# Patient Record
Sex: MALE | Race: BLACK OR AFRICAN AMERICAN | NOT HISPANIC OR LATINO | Employment: PART TIME | URBAN - METROPOLITAN AREA
[De-identification: names, ages, dates, MRNs, and addresses within clinical notes are randomized per-mention and may not be internally consistent; named-entity substitution may affect disease eponyms.]

---

## 2023-03-30 DIAGNOSIS — Z71.3 NUTRITIONAL COUNSELING: Primary | ICD-10-CM

## 2023-04-05 ENCOUNTER — CLINICAL SUPPORT (OUTPATIENT)
Dept: NUTRITION | Facility: CLINIC | Age: 23
End: 2023-04-05
Payer: OTHER GOVERNMENT

## 2023-04-05 DIAGNOSIS — Z71.3 NUTRITIONAL COUNSELING: ICD-10-CM

## 2023-04-05 PROCEDURE — 97802 MEDICAL NUTRITION INDIV IN: CPT | Mod: 95,,, | Performed by: DIETITIAN, REGISTERED

## 2023-04-05 PROCEDURE — 97802 PR MED NUTR THER, 1ST, INDIV, EA 15 MIN: ICD-10-PCS | Mod: 95,,, | Performed by: DIETITIAN, REGISTERED

## 2023-04-05 NOTE — PROGRESS NOTES
The patient location is: Pt home  The chief complaint leading to consultation is: lowering his A1c, and cholesterol    Visit type: audiovisual    Face to Face time with patient: 90 minutes  120 minutes of total time spent on the encounter, which includes face to face time and non-face to face time preparing to see the patient (eg, review of tests), Obtaining and/or reviewing separately obtained history, Documenting clinical information in the electronic or other health record, Independently interpreting results (not separately reported) and communicating results to the patient/family/caregiver, or Care coordination (not separately reported).         Each patient to whom he or she provides medical services by telemedicine is:  (1) informed of the relationship between the physician and patient and the respective role of any other health care provider with respect to management of the patient; and (2) notified that he or she may decline to receive medical services by telemedicine and may withdraw from such care at any time.    Notes:   Nutrition Assessment    Visit Type: Insurance initial  Session Time:  2 Hours  Reason for MNT visit: Pt in for education and nutrition counseling regarding Prediabetes and high cholesterol .       Age: 22 y.o.  Wt:   Wt Readings from Last 1 Encounters:   04/13/23 77.1 kg (170 lb)     Ht:   Ht Readings from Last 1 Encounters:   No data found for Ht     BMI:   BMI Readings from Last 1 Encounters:   No data found for BMI       Client states:  [place] in school [place]. Where he is living, he does not have the option to cook for himself and often purchases meals outside of the home. He does not get his care at [facility], pt reported that he is prediabetic and has high cholesterol. After going to the doctor and hearing the diagnosis, pt has been making positive changes to his diet on his own. Pt reported that he used to eat little megan cakes throughout the day  and have multiple energy drinks for energy.    Medical History      No past medical history on file.    No past surgical history on file.       Medications    Prior to Admission medications    Not on File        Vitamins, Minerals, and/or Supplements:  none reported     Food Allergies or Intolerances:  NKFA     Social History    Marital status:  Single    Social History     Tobacco Use    Smoking status: Not on file    Smokeless tobacco: Not on file   Substance Use Topics    Alcohol use: Not on file     Current Alcohol use: monthly or less, 1-2 beverages    Lab Reports   Reviewed and noted    No results found for: [id number], TSH, FREET4, CRP, SEDRATE, AST, ALT, GLUCOSE, LIPIDTOTCHOL, HDL, LDLCALC, TRIG, HGBA1C      BP Readings from Last 1 Encounters:   No data found for BP        24-hour Recall    Pt purchases majority of his food intake from a quick food location    Food choices (After Midnight)  Patient eating midnight to 4am: (P) Once or twice a month   Home cooked meals (Midnight - 4am): (P) none   Fast food meals (Midnight - 4am): (P) salad wraps   Snacks (Midnight - 4am): (P) sun chips or pistachios      Food choices (Early Morning)  Patient eats 4am to 8am: (P) Never                  Food choices (Morning)  Patient eats 8am to noon: (P) Several times a week   Home cooked meals (8am - noon): (P) none   Fast food meals (8am - midnight): (P) ham and cheese croissants   Snacks (8am - noon): (P) none     Food choices (Afternoon)  Patient eats noon to 4pm: (P) Several times a week   Home cooked meals (Noon - 4pm): (P) none   Snacks (Noon to 4pm): (P) pistachios     Food choices (Evening)   Patient eats 4pm to 8pm: (P) Several times a week   Home cooked meals (4pm - 8pm): (P) none   Fast food meals (4pm - 8pm): (P) quesadilla orange chicken   Snacks (4pm - 8pm): (P) pistachios, chips     Food choices (Late evening)  R [facility] PEQ NUTRITION HOW OFTEN EATING LATE EVENING: (P) Once or twice a week   Home cooked meals  (8pm - midnight): (P) none   Fast food meals (8pm - midnight): (P) pizza, fried chicken   Snacks (8pm - midnight: (P) chips, candy      Beverages  How much water consumed (per day?): (P) 5   Cups of milk consumed (per day?): (P) 0       Cups of  juice consumed (per day?): (P) 1   Number of supplement shakes (per day?): (P) 0       Number of cups of coffee (per day?): (P) 0           Cups of soda consumed (per day?): (P) 0   Other non-alcoholic beverages consumed (per day?): (P) 0          LIFESTYLE FACTORS    Dinning out: 6+ x per week, mostly fast food    Meal preparation/shopping: Who does the grocery shopping:: (P) Mother Who prepares the meals: (P) Dad     Sleep: poor and pt reported that he really does not get a full nights [place] 2-4 am.    Stress Level: moderate    Support System:  friends and family    Exercise Regimen: Moderately active (moderate intensity, 3-5 days a week) Pt is active working the airport, helping get the baggage.      Diagnosis    Inconsistent carbohydrate intake related to consuming high amounts of added sugar as evidenced by food recall, A1c elevated.      Intervention    Estimated Energy Requirements:   Calories: 2000  Protein: 150-175 g  Carbohydrates: 175-200 g  Total Fat: 65-75 g  Baseline for fluids: 85 fl ounces + sweat loss    Recommendations & Goals:  Patient goals and recommendations are tailored to the specific patient's needs, readiness to change, lifestyle, culture, skills, resources, & abilities. Strategies to help achieve these nutrition-related goals were discussed which can include but are not limited to SMART goal setting & mindful eating.     Aim for a minimum of 7 hours sleep   Exercise 60 minutes most days  Eat breakfast within 1-2 hours of waking up  Try not to skip any meals or snacks, not going more than 3-4 hours without eating   At each meal and snack, try to include a source of fiber + lean protein + healthy fat      Written Materials Provided  These resources are intended to assist the patient in making it easier to choose recommended options when eating out & to identify better-for-you brands at the grocery store:    Meal Planning Guide with recommendations discussed along with portion sizes and a customized meal plan   Fueling Well On-the-Go Food Guide  Eat Fit Shopping Guide  Lifestyle Nutrition Meal Guide  RD contact information    Goals:  1.  Decrease fried food intake  2.  Increase grilled options  3.  Increase produce intake      Monitoring/Evaluation    Monitor the following:  Weight  Sleep  Stress Management  Movement  Nutrient intake in reference to meal plan    Communicated with healthcare provider and documented plan for referral to appropriate agency/healthcare provider as needed    Patient motivation, anticipated barriers, expected compliance: Patient is motivated and has verbalized understanding and intent to comply.     Comprehension: good     Follow-up: As needed

## 2023-04-13 VITALS — WEIGHT: 170 LBS

## 2023-04-13 NOTE — PATIENT INSTRUCTIONS
Name: Taz Goldstein Jr.       Recommended daily energy requirements to reach your goals:  2000 Calories  150-175 grams Protein  175-200 grams Carbohydrates  65-75 grams Fat  85 ounces of fluid per day + sweat loss